# Patient Record
Sex: FEMALE | Race: WHITE | Employment: UNEMPLOYED | URBAN - METROPOLITAN AREA
[De-identification: names, ages, dates, MRNs, and addresses within clinical notes are randomized per-mention and may not be internally consistent; named-entity substitution may affect disease eponyms.]

---

## 2019-05-06 ENCOUNTER — HOSPITAL ENCOUNTER (EMERGENCY)
Age: 39
Discharge: HOME | End: 2019-05-06
Attending: EMERGENCY MEDICINE
Payer: MEDICAID

## 2019-05-06 VITALS
BODY MASS INDEX: 28.79 KG/M2 | DIASTOLIC BLOOD PRESSURE: 76 MMHG | TEMPERATURE: 99.1 F | WEIGHT: 190 LBS | HEART RATE: 76 BPM | SYSTOLIC BLOOD PRESSURE: 142 MMHG | OXYGEN SATURATION: 98 % | HEIGHT: 68 IN | RESPIRATION RATE: 17 BRPM

## 2019-05-06 DIAGNOSIS — R22.0 RIGHT FACIAL SWELLING: ICD-10-CM

## 2019-05-06 DIAGNOSIS — K08.89 PAIN, DENTAL: Primary | ICD-10-CM

## 2019-05-06 PROCEDURE — 99281 EMR DPT VST MAYX REQ PHY/QHP: CPT

## 2019-05-06 RX ORDER — CLINDAMYCIN HYDROCHLORIDE 150 MG/1
300 CAPSULE ORAL 3 TIMES DAILY
Qty: 42 CAP | Refills: 0 | Status: SHIPPED | OUTPATIENT
Start: 2019-05-06 | End: 2019-05-13

## 2019-05-06 RX ORDER — TRAMADOL HYDROCHLORIDE 50 MG/1
50 TABLET ORAL
Qty: 10 TAB | Refills: 0 | Status: SHIPPED | OUTPATIENT
Start: 2019-05-06 | End: 2019-05-09

## 2019-05-06 NOTE — DISCHARGE INSTRUCTIONS
Patient Education        Tooth and Gum Pain: Care Instructions  Your Care Instructions    The most common causes of dental pain are tooth decay and gum disease. Pain can also be caused by an infection of the tooth (abscess) or the gums. Or you may have pain from a broken or cracked tooth. Other causes of pain include infection and damage to a tooth from nervous grinding of your teeth. A wisdom tooth can be painful when it is coming in but cannot break through the gum. It can also be painful when the tooth is only partway in and extra gum tissue has formed around it. The tissue can get inflamed (pericoronitis), and sometimes it gets infected. Prompt dental care can help find the cause of your toothache and keep the tooth from dying or gum disease from getting worse. Self-care at home may reduce your pain and discomfort. Follow-up care is a key part of your treatment and safety. Be sure to make and go to all appointments, and call your dentist or doctor if you are having problems. It's also a good idea to know your test results and keep a list of the medicines you take. How can you care for yourself at home? · To reduce pain and facial swelling, put an ice or cold pack on the outside of your cheek for 10 to 20 minutes at a time. Put a thin cloth between the ice and your skin. Do not use heat. · If your doctor prescribed antibiotics, take them as directed. Do not stop taking them just because you feel better. You need to take the full course of antibiotics. · Ask your doctor if you can take an over-the-counter pain medicine, such as acetaminophen (Tylenol), ibuprofen (Advil, Motrin), or naproxen (Aleve). Be safe with medicines. Read and follow all instructions on the label. · Avoid very hot, cold, or sweet foods and drinks if they increase your pain. · Rinse your mouth with warm salt water every 2 hours to help relieve pain and swelling. Mix 1 teaspoon of salt in 8 ounces of water.   · Talk to your dentist about using special toothpaste for sensitive teeth. To reduce pain on contact with heat or cold or when brushing, brush with this toothpaste regularly or rub a small amount of the paste on the sensitive area with a clean finger 2 or 3 times a day. Floss gently between your teeth. · Do not smoke or use spit tobacco. Tobacco use can make gum problems worse, decreases your ability to fight infection in your gums, and delays healing. If you need help quitting, talk to your doctor about stop-smoking programs and medicines. These can increase your chances of quitting for good. When should you call for help? Call 911 anytime you think you may need emergency care. For example, call if:    · You have trouble breathing.    Call your dentist or doctor now or seek immediate medical care if:    · You have signs of infection, such as:  ? Increased pain, swelling, warmth, or redness. ? Red streaks leading from the area. ? Pus draining from the area. ? A fever.    Watch closely for changes in your health, and be sure to contact your doctor if:    · You do not get better as expected. Where can you learn more? Go to http://allyssa-ariana.info/. Enter [id number] in the search box to learn more about \"Tooth and Gum Pain: Care Instructions. \"  Current as of: March 27, 2018  Content Version: 11.9  © 1688-1646 [employer]. Care instructions adapted under license by [employer] (which disclaims liability or warranty for this information). If you have questions about a medical condition or this instruction, always ask your healthcare professional. [place] [address] any warranty or liability for your use of this information.

## 2019-05-06 NOTE — ED NOTES
I have reviewed discharge instructions with the patient. The patient verbalized understanding. Current Discharge Medication List  
  
START taking these medications Details  
clindamycin (CLEOCIN) 150 mg capsule Take 2 Caps by mouth three (3) times daily for 7 days. Qty: 42 Cap, Refills: 0  
  
traMADol (ULTRAM) 50 mg tablet Take 1 Tab by mouth every six (6) hours as needed for Pain for up to 3 days. Max Daily Amount: 200 mg. Qty: 10 Tab, Refills: 0 Associated Diagnoses: Pain, dental  
  
  
Patient armband removed and shredded

## 2019-05-06 NOTE — ED PROVIDER NOTES
EMERGENCY DEPARTMENT HISTORY AND PHYSICAL EXAM 
 
Date: 5/6/2019 Patient Name: Gregory Peabody History of Presenting Illness Chief Complaint Patient presents with  Dental Pain History Provided By: Patient Chief Complaint: Dental pain Duration: 5 Days Timing:  Constant Location: right upper jaw Quality: Aching Severity: 8 out of 10 Modifying Factors: minimal relief with extra strength tylenol q3hrs Associated Symptoms: nausea, R-sided facial swelling Additional History (Context): Gregory Peabody is a 41 y.o. female with No significant past medical history who presents with R-sided upper dental pain x 5days. Patient states she has a hx of dental abscess during pregnancy and was diagnosed with a dental abscess on the right upper jaw that required 3 root canals during the last month of her pregnancy 6 months ago. [facility] but does not know provider name. Patient states that the provider gave her a standing rx for amoxicillin 500mg BID in case she needed it for future dental infection which she began taking 5 days ago. Patient states advil provided no pain relief and switched to extra strength tylenol q3 hours with minimal relief. Patient denies fever, chills, vomiting, chest pain, palpitations, SOB. PCP: None Past History Past Medical History: 
History reviewed. No pertinent past medical history. Past Surgical History: 
History reviewed. No pertinent surgical history. Family History: 
History reviewed. No pertinent family history. Social History: 
Social History Tobacco Use  Smoking status: Current Every Day Smoker  Smokeless tobacco: Never Used Substance Use Topics  Alcohol use: Yes Comment: social  
 Drug use: No  
 
 
Allergies: 
No Known Allergies Review of Systems Review of Systems Constitutional: Negative for chills and fever. HENT: Positive for dental problem (right upper jaw) and facial swelling (right ). Negative for congestion, ear pain, sinus pressure, sinus pain, sore throat and trouble swallowing. Eyes: Negative for pain. Respiratory: Negative for cough, chest tightness and shortness of breath. Cardiovascular: Negative for chest pain and palpitations. Gastrointestinal: Positive for nausea. Negative for abdominal pain, diarrhea and vomiting. Musculoskeletal: Negative for arthralgias, neck pain and neck stiffness. Neurological: Positive for headaches (right sided). Negative for dizziness, weakness, light-headedness and numbness. Hematological: Negative for adenopathy. All Other Systems Negative Physical Exam  
 
Vitals:  
 05/06/19 0494 BP: 142/76 Pulse: 76 Resp: 17 Temp: 99.1 °F (37.3 °C) SpO2: 98% Weight: 86.2 kg (190 lb) Height: 5' 8\" (1.727 m) Physical Exam  
Constitutional: She appears well-developed and well-nourished. No distress. HENT:  
Head:  
 
 
Mouth/Throat: Normal dentition. No dental abscesses or dental caries. No oropharyngeal exudate, posterior oropharyngeal edema or tonsillar abscesses. Eyes: Conjunctivae are normal. Right eye exhibits no discharge. Left eye exhibits no discharge. Neck: Neck supple. Cardiovascular: Normal rate, regular rhythm and normal heart sounds. Pulmonary/Chest: Effort normal and breath sounds normal.  
Lymphadenopathy:  
  She has no cervical adenopathy. Diagnostic Study Results Labs - No results found for this or any previous visit (from the past 12 hour(s)). Radiologic Studies - No orders to display CT Results  (Last 48 hours) None CXR Results  (Last 48 hours) None Medical Decision Making I am the first provider for this patient. I reviewed the vital signs, available nursing notes, past medical history, past surgical history, family history and social history. Vital Signs-Reviewed the patient's vital signs. Pulse Oximetry Analysis - 98% on room air Records Reviewed: Nursing Notes Procedures: 
Procedures Provider Notes (Medical Decision Making): pt with right upper dental pain. Has root canal with caps to right upper teeth; tender to #4; no abscess; mild to moderated facial swelling w/o cellulitis. Will cover with clinda and refer back to yasmin reid. EDMOND Espinal 11:21 AM 
 
 
 
MED RECONCILIATION: 
No current facility-administered medications for this encounter. Current Outpatient Medications Medication Sig  
 clindamycin (CLEOCIN) 150 mg capsule Take 2 Caps by mouth three (3) times daily for 7 days.  traMADol (ULTRAM) 50 mg tablet Take 1 Tab by mouth every six (6) hours as needed for Pain for up to 3 days. Max Daily Amount: 200 mg. Disposition: 
home DISCHARGE NOTE:  
 
Pt has been reexamined. Patient has no new complaints, changes, or physical findings. Care plan outlined and precautions discussed. All medications were reviewed with the patient; will d/c home with clinda. All of pt's questions and concerns were addressed. Patient was instructed and agrees to follow up with dentist, as well as to return to the ED upon further deterioration. Patient is ready to go home. Follow-up Information Follow up With Specialties Details Why Contact Info [address] [place] [phone] Current Discharge Medication List  
  
START taking these medications Details  
clindamycin (CLEOCIN) 150 mg capsule Take 2 Caps by mouth three (3) times daily for 7 days. Qty: 42 Cap, Refills: 0  
  
traMADol (ULTRAM) 50 mg tablet Take 1 Tab by mouth every six (6) hours as needed for Pain for up to 3 days. Max Daily Amount: 200 mg. Qty: 10 Tab, Refills: 0 Associated Diagnoses: Pain, dental  
  
  
 
 
 
Diagnosis Clinical Impression: 1.  Pain, dental   
 2. Right facial swelling

## 2021-10-26 ENCOUNTER — HOSPITAL ENCOUNTER (OUTPATIENT)
Dept: OCCUPATIONAL MEDICINE | Age: 41
Discharge: HOME | End: 2021-10-26
Attending: FAMILY MEDICINE

## 2021-10-26 ENCOUNTER — OFFICE VISIT (OUTPATIENT)
Dept: ORTHOPEDIC SURGERY | Age: 41
End: 2021-10-26
Payer: MEDICAID

## 2021-10-26 VITALS
WEIGHT: 225.8 LBS | RESPIRATION RATE: 16 BRPM | OXYGEN SATURATION: 97 % | HEIGHT: 68 IN | BODY MASS INDEX: 34.22 KG/M2 | HEART RATE: 98 BPM

## 2021-10-26 DIAGNOSIS — R20.2 NUMBNESS AND TINGLING IN RIGHT HAND: ICD-10-CM

## 2021-10-26 DIAGNOSIS — M50.30 DEGENERATION, INTERVERTEBRAL DISC, CERVICAL: ICD-10-CM

## 2021-10-26 DIAGNOSIS — S29.011A CHEST WALL MUSCLE STRAIN, INITIAL ENCOUNTER: ICD-10-CM

## 2021-10-26 DIAGNOSIS — R20.0 NUMBNESS AND TINGLING IN RIGHT HAND: ICD-10-CM

## 2021-10-26 DIAGNOSIS — M75.41 SHOULDER IMPINGEMENT, RIGHT: ICD-10-CM

## 2021-10-26 DIAGNOSIS — M75.41 SHOULDER IMPINGEMENT, RIGHT: Primary | ICD-10-CM

## 2021-10-26 PROCEDURE — 20610 DRAIN/INJ JOINT/BURSA W/O US: CPT | Performed by: FAMILY MEDICINE

## 2021-10-26 PROCEDURE — 99204 OFFICE O/P NEW MOD 45 MIN: CPT | Performed by: FAMILY MEDICINE

## 2021-10-26 RX ORDER — PREDNISONE 20 MG/1
TABLET ORAL
Qty: 28 TABLET | Refills: 0 | Status: SHIPPED | OUTPATIENT
Start: 2021-10-26

## 2021-10-26 RX ORDER — METHYLPREDNISOLONE ACETATE 40 MG/ML
40 INJECTION, SUSPENSION INTRA-ARTICULAR; INTRALESIONAL; INTRAMUSCULAR; SOFT TISSUE ONCE
Status: COMPLETED | OUTPATIENT
Start: 2021-10-26 | End: 2021-10-26

## 2021-10-26 RX ADMIN — METHYLPREDNISOLONE ACETATE 40 MG: 40 INJECTION, SUSPENSION INTRA-ARTICULAR; INTRALESIONAL; INTRAMUSCULAR; SOFT TISSUE at 11:00

## 2021-10-26 NOTE — PATIENT INSTRUCTIONS
Search YouTube for my channel:    Dr. Leidy Mak cuff    Neck/Upper Back          30 seconds 3 times each side and do with arm at side, 45 degrees up, and 45 degrees down. Repeat 4-6 times per day.

## 2021-10-26 NOTE — PROCEDURES
PROCEDURE NOTE:  Time out: 1047am  * Patient was identified by name and date of birth   * Agreement on procedure being performed was verified  * Risks and Benefits explained to the patient  * Procedure site verified and marked as necessary  * Patient was positioned for comfort  * Consent was signed and verified. Risks/benefits including but not limited to bleeding, infection, and scarring discussed and Pt wishes to proceed with procedure. The area was prepped with betadine. Ethyl chloride spray was used, under sterile technique and without ultrasound guidance  1cc of 40mg/cc methylprednisolone acetate and 2cc lidocaine were injected into right shoulder subacromial bursa. Sterile gauze used to clean the area. Blood loss minimal.  Noticed improvement in pain Sx within 5 minutes (now rated 1/10). Tolerated procedure well. Discussed possible signs/Sx of infxn, and advised to seek care if concerned. Ultrasound images (if applicable) digitally attached to CPT order in patients chart.

## 2021-10-26 NOTE — PROGRESS NOTES
HISTORY OF PRESENT ILLNESS    Anamaria Serrano 1980 is a 37y.o. year old female comes in today as new patient for: right shoulder pain, hand numbness right    Patients symptoms have been present for 1 months. Pain level 5/10 shoulder burning in to arm. It has slightly improved with prednisone taper x2 which finished 3 days ago and gabapentin from [facility] but no XR. It is described as stiffness neck and burning pains shoulder and 1-3 fingers numb right. Pain certain motions right shoulder and pain laying on that side. Does have injury to neck 8-10 years ago after falling down 14 stairs. IMAGING: XR cervical/right shoulder pending. History reviewed. No pertinent surgical history. Social History     Socioeconomic History    Marital status: UNKNOWN     Spouse name: Not on file    Number of children: Not on file    Years of education: Not on file    Highest education level: Not on file   Tobacco Use    Smoking status: Current Every Day Smoker     Packs/day: 0.25    Smokeless tobacco: Never Used   Vaping Use    Vaping Use: Never used   Substance and Sexual Activity    Alcohol use: Yes     Comment: social    Drug use: No     Social Determinants of Health     Financial Resource Strain:     Difficulty of Paying Living Expenses:    Food Insecurity:     Worried About Running Out of Food in the Last Year:     [address] in the Last Year:    Transportation Needs:     Lack of Transportation (Medical):      Lack of Transportation (Non-Medical):    Physical Activity:     Days of Exercise per Week:     Minutes of Exercise per Session:    Stress:     Feeling of Stress :    Social Connections:     Frequency of Communication with Friends and Family:     Frequency of Social Gatherings with Friends and Family:     Attends Mandaen Services:     Active Member of Clubs or Organizations:     Attends Club or Organization Meetings:     Marital Status:         Past Medical History:   Diagnosis Date  Anemia      No family history on file. ROS:  See HPI      Objective:  Pulse 98   Resp 16   Ht 5' 8\" (1.727 m)   Wt 225 lb 12.8 oz (102.4 kg)   SpO2 97%   BMI 34.33 kg/m²   NEURO:  Sensation intact light touch B/L upper extremities but decreased 1-3 fingers right hand. right hand dominant. DTRs normal biceps and triceps   M/S:  Shoulder ROM Normal  bilaterally. Spurling's negative bilaterally  right Shoulder:  Empty can positive External rotation negative. Internal rotation negative. [place] negative. SLAP negative. Load and Shift +1 Anterior, 1 Posterior. Strength +5/5 bilaterally upper extremities. Crossover test negative. Negative atrophy bilaterally. Negative TTP at [facility] joint. Apprehension test negative. Shelby-Migue Test positive. Yergason's test negative. Speed's test negative. TTP serratus anterior right      Assessment/Plan:     ICD-10-CM ICD-9-CM    1. Shoulder impingement, right  M75.41 726.2 XR SHOULDER RT AP/LAT MIN 2 V      DRAIN/INJECT LARGE JOINT/BURSA      REFERRAL TO PHYSICAL THERAPY      predniSONE (DELTASONE) 20 mg tablet   2. Chest wall muscle strain, initial encounter  S29.011A 848.8 XR SHOULDER RT AP/LAT MIN 2 V      REFERRAL TO PHYSICAL THERAPY      predniSONE (DELTASONE) 20 mg tablet   3. Numbness and tingling in right hand  R20.0 782.0 XR SPINE CERV PA LAT ODONT 3 V MAX    R20.2     4. Degeneration, intervertebral disc, cervical  M50.30 722.4 XR SPINE CERV PA LAT ODONT 3 V MAX      REFERRAL TO PHYSICAL THERAPY      predniSONE (DELTASONE) 20 mg tablet       Patient (or guardian if minor) verbalizes understanding of evaluation and plan. Will start HEP, PT and Rx for prednisone tapr after inject subacromial today as above and plan follow-up 6 weeks. Stretch serratus anterior.     Total time spent on encounter including chart/imaging/lab review and evaluation/documentation/demo home program/coordination of care/form completion but not including time for any procedures/manipulation 52 minutes.

## 2021-10-26 NOTE — PROGRESS NOTES
AVS reviewed: YES  HEP: Pt effectively performed  Resources Provided: YES Both Videos & Photos  Patient questions/concerns answered: NO pt declined questions or concerns  Patient verbalized understanding of treatment plan: YES

## 2021-10-26 NOTE — LETTER
Name:  Beryl Brown   :  1980  MR#:  [id number]   [facility] / PROCEDURE   1. I (we), _____Carla Purdy__________ authorize Mark Potts DO, FAOASM                       (Patient Name)     (Provider / Proceduralist)   and/or such assistants as may be selected by him/her, to perform the following operation/procedures   _______________inject steroid in to right shoulder subacromial_____________________  _________________________________________________________________________  Note: If unable to obtain consent prior to an emergent procedure, document the emergent reason in the medical record. This procedure has been explained to my (our) satisfaction and included in the explanation was:   A) the intended benefit, nature, and extent of the procedure to be performed;   B) the significant risks involved and the probability of success;   C) alternative procedures and methods of treatment;   D) the dangers and probable consequences of such alternatives (including no procedure or treatment); E) the expected consequences of the procedure on my future health;   F) whether other qualified individuals would be performing important surgical tasks and / or whether  would be present to advise or support the procedure. I (we) understand that there are other risks of infection and other serious complications in the pre-operative/procedural and postoperative/procedural stages of my (our) care. I (we) have asked all of the questions which I (we) thought were important in deciding whether or not to undergo treatment or diagnosis. These questions have been answered to my (our) satisfaction. I (we) understand that no assurance can be given that the procedure will be a success, and no guarantee or warranty of success has been given to me (us).    2. It has been explained to me (us) that during the course of the operation/procedure, unforeseen conditions may be revealed that necessitate extension of the original procedure(s) or different procedure(s) than those set forth in Paragraph 1. I (we) authorize and request that the above-named physician, his/her assistants or his/her designees, perform procedures as necessary and desirable if deemed to be in my (our) best interest.     3. I acknowledge that other health care personnel may be observing this procedure for the purpose of medical education or other specified purposes as may be necessary as requested and/or approved by my (our) physician. 4. I (we) consent to the disposal by the hospital Pathologist of the removed tissue, parts or organs in accordance with hospital policy. Page 1 of 2          Name:  Cisco Douglas         :  1980         MR#:  [id number]      8. I do_x_ do not______ consent to the use of a local infiltration pain blocking agent that will be used by my provider/surgical provider to help alleviate pain during my procedure. 6. I do_____ do not_x_ consent to an emergent blood transfusion in the case of a life-threatening situation that requires blood components to be administered. This consent is valid for 24 hours from the beginning of the procedure. 7. This patient does _____ or does not _x__currently have a DNR status/order. If DNR order is in place, obtain \"Addendum to the Surgical Consent for ALL Patients with a DNR Order\" to address antoinette-operative status for limited intervention or DNR suspension. 8. I have read and fully understand the above Consent for Operation/Procedure and that all blanks were completed before I signed the consent.    X____________________________________ _____Carla R Rawlings_________   Date: 10/26/2021/_______am/pm   Signature of Patient or legal representative.    _____________________________ ____Cassidy Ruiz, ATC____Traleni Aceves, LPN_____   Date: /_______ am/pm   Witness to Signature Printed Name Date / Time    (If patient is unable to sign or is a minor, complete the following)     Patient is a minor, ____years of age, or unable to sign because:   _________________________________________________________________________  Oliver Riley If a phone consent is obtained, consent will be documented by using two health care professionals, each affirming that the consenting party has no questions and gives consent for the procedure discussed with the physician/provider.     _________________________________ _______________________________   Date: ______/_____am/pm   2nd witness to phone consent Printed name Date / Time   Informed consent:   I have provided the explanation described above in section 1 to the patient and/or legal representative. I have provided the patient and/or legal representative with an opportunity to ask any questions about the proposed operation/procedure. _                       _ __REZA Hartman____ 10/26/2021/_______ am/pm   Provider / Proceduralist Printed Name Date / Time   This Provider / Proceduralist performing the surgery is ONLY for Office-based procedures in [place]   [x] Board certified or Board eligible by one of the [employer], the [employer] [facility] the [employer], the [employer] of Podiatric Medicine, the [employer] of Foot and Ankle Surgery, or other board as approved by the hospital for medical staff appointment.    Revised 8/2/2021 Page 2 of 2

## 2021-11-09 ENCOUNTER — HOSPITAL ENCOUNTER (OUTPATIENT)
Dept: PHYSICAL THERAPY | Age: 41
Discharge: HOME | End: 2021-11-09
Attending: FAMILY MEDICINE

## 2021-11-09 NOTE — PROGRESS NOTES
PT DAILY TREATMENT NOTE - [facility]     Patient Name: Tracy Rhodes  Date:2021  : 1980  [x]  Patient  Verified  Payor: BLUE CROSS MEDICAID / Plan: Misaeltwyla Cochran / Product Type: Managed Care Medicaid /    In time:***  Out time:***  Total Treatment Time (min): ***  Total Timed Codes (min): ***  1:1 Treatment Time ( only): ***   Visit #: *** of ***    Treatment Area: Right shoulder pain [M25.511]  Neck pain [M54.2]    SUBJECTIVE  Pain Level (0-10 scale): ***  Any medication changes, allergies to medications, adverse drug reactions, diagnosis change, or new procedure performed?: [x] No    [] Yes (see summary sheet for update)  Subjective functional status/changes:   [] No changes reported    Does have injury to neck 8-10 years ago after falling down 14 stairs.     OBJECTIVE    Modality rationale: {BSI INMOTION MODALITIES:01308} to improve the patients ability to ***   Min Type Additional Details    [] Estim:  []Unatt       []IFC  []Premod                        []Other:  []w/ice   []w/heat  Position:  Location:    [] Estim: []Att    []TENS instruct  []NMES                    []Other:  []w/US   []w/ice   []w/heat  Position:  Location:    []  Traction: [] Cervical       []Lumbar                       [] Prone          []Supine                       []Intermittent   []Continuous Lbs:  [] before manual  [] after manual    []  Ultrasound: []Continuous   [] Pulsed                           []1MHz   []3MHz W/cm2:  Location:    []  Iontophoresis with dexamethasone         Location: [] Take home patch   [] In clinic    []  Ice     []  heat  []  Ice massage  []  Laser   []  Anodyne Position:  Location:    []  Laser with stim  []  Other:  Position:  Location:    []  Vasopneumatic Device Pressure:       [] lo [] med [] hi   Temperature: [] lo [] med [] hi   [] Skin assessment post-treatment:  []intact []redness- no adverse reaction    []redness  adverse reaction:     *** min []Eval []Re-Eval       *** min Therapeutic Exercise:  [] See flow sheet :   Rationale: {BSHSI IMMOTION THER EX:96632} to improve the patients ability to ***    *** min Therapeutic Activity:  []  See flow sheet :   Rationale: {BSHSI IMMOTION THER EX:02531}  to improve the patients ability to ***     *** min Neuromuscular Re-education:  []  See flow sheet :   Rationale: {BSHSI IMMOTION THER EX:85360}  to improve the patients ability to ***    *** min Manual Therapy:  ***   Rationale: {BSHSI IMMOTION MANUAL THERAPY:75255} to ***    *** min Gait Training:  ___ feet with ___ device on level surfaces with ___ level of assist   Rationale: With   [] TE   [] TA   [] neuro   [] other: Patient Education: [x] Review HEP    [] Progressed/Changed HEP based on:   [] positioning   [] body mechanics   [] transfers   [] heat/ice application    [] other:      Other Objective/Functional Measures:     Degenerative disc disease of the mid to lower cervical spine. Mild posterior offset of C5 on C6 with degenerative disc changes    Pain Level (0-10 scale) post treatment: ***    ASSESSMENT/Changes in Function: ***    Patient will continue to benefit from skilled PT services to modify and progress therapeutic interventions, address functional mobility deficits, address ROM deficits, address strength deficits, analyze and address soft tissue restrictions, analyze and cue movement patterns, analyze and modify body mechanics/ergonomics, assess and modify postural abnormalities and address imbalance/dizziness to attain remaining goals.      []  See Plan of Care  []  See progress note/recertification  []  See Discharge Summary         Progress towards goals / Updated goals:    See eval  PLAN  [x]  Upgrade activities as tolerated     [x]  Continue plan of care  []  Update interventions per flow sheet       []  Discharge due to:_  []  Other:_      Jacob Dale, PT 11/9/2021  1:39 PM    Future Appointments   Date Time Provider Department Center   11/9/2021  5:15 PM Nataly Hutton PT [id number]   12/7/2021 10:00 AM DO VELMA Cervantes AMB

## 2021-11-23 ENCOUNTER — HOSPITAL ENCOUNTER (OUTPATIENT)
Dept: PHYSICAL THERAPY | Age: 41
Discharge: HOME | End: 2021-11-23
Attending: FAMILY MEDICINE

## 2021-11-23 NOTE — PROGRESS NOTES
PT DAILY TREATMENT NOTE - [facility]     Patient Name: Suad Seed  Date:2021  : 1980  [x]  Patient  Verified  Payor: BLUE CROSS MEDICAID / Plan: Miesha Martinez / Product Type: Managed Care Medicaid /    In time:***  Out time:***  Total Treatment Time (min): ***  Total Timed Codes (min): ***  1:1 Treatment Time ( only): ***   Visit #: *** of ***    Treatment Area: Right shoulder pain [M25.511]  Neck pain [M54.2]    SUBJECTIVE  Pain Level (0-10 scale): ***  Any medication changes, allergies to medications, adverse drug reactions, diagnosis change, or new procedure performed?: [x] No    [] Yes (see summary sheet for update)  Subjective functional status/changes:   [] No changes reported  ***    OBJECTIVE    Modality rationale: {BSHSI INMOTION MODALITIES:74901} to improve the patients ability to ***   Min Type Additional Details    [] Estim:  []Unatt       []IFC  []Premod                        []Other:  []w/ice   []w/heat  Position:  Location:    [] Estim: []Att    []TENS instruct  []NMES                    []Other:  []w/US   []w/ice   []w/heat  Position:  Location:    []  Traction: [] Cervical       []Lumbar                       [] Prone          []Supine                       []Intermittent   []Continuous Lbs:  [] before manual  [] after manual    []  Ultrasound: []Continuous   [] Pulsed                           []1MHz   []3MHz W/cm2:  Location:    []  Iontophoresis with dexamethasone         Location: [] Take home patch   [] In clinic    []  Ice     []  heat  []  Ice massage  []  Laser   []  Anodyne Position:  Location:    []  Laser with stim  []  Other:  Position:  Location:    []  Vasopneumatic Device Pressure:       [] lo [] med [] hi   Temperature: [] lo [] med [] hi   [] Skin assessment post-treatment:  []intact []redness- no adverse reaction    []redness  adverse reaction:     *** min []Eval                  []Re-Eval       *** min Therapeutic Exercise:  [] See flow sheet :   Rationale: {BSHSI IMMOTION THER EX:77644} to improve the patients ability to ***    *** min Therapeutic Activity:  []  See flow sheet :   Rationale: {BSHSI IMMOTION THER EX:46550}  to improve the patients ability to ***     *** min Neuromuscular Re-education:  []  See flow sheet :   Rationale: {BSHSI IMMOTION THER EX:89247}  to improve the patients ability to ***    *** min Manual Therapy:  ***   Rationale: {BSHSI IMMOTION MANUAL THERAPY:91502} to ***    *** min Gait Training:  ___ feet with ___ device on level surfaces with ___ level of assist   Rationale: With   [] TE   [] TA   [] neuro   [] other: Patient Education: [x] Review HEP    [] Progressed/Changed HEP based on:   [] positioning   [] body mechanics   [] transfers   [] heat/ice application    [] other:      Other Objective/Functional Measures: ***     Pain Level (0-10 scale) post treatment: ***    ASSESSMENT/Changes in Function: ***    Patient will continue to benefit from skilled PT services to {[facility] INMOTION ASSESSMENT STATEMENTS:20159} to attain remaining goals.      []  See Plan of Care  []  See progress note/recertification  []  See Discharge Summary         Progress towards goals / Updated goals:  ***    PLAN  []  Upgrade activities as tolerated     []  Continue plan of care  []  Update interventions per flow sheet       []  Discharge due to:_  []  Other:_      Vikki Majano PT 11/23/2021  10:30 AM    Future Appointments   Date Time Provider Melissa Collier   11/23/2021  5:15 PM Chuck Boucher, PT [facility]   12/7/2021 10:00 AM DO VELMA Olson BS AMB

## 2021-12-07 ENCOUNTER — OFFICE VISIT (OUTPATIENT)
Dept: ORTHOPEDIC SURGERY | Age: 41
End: 2021-12-07
Payer: MEDICAID

## 2021-12-07 VITALS
HEART RATE: 107 BPM | HEIGHT: 68 IN | OXYGEN SATURATION: 99 % | WEIGHT: 226.8 LBS | BODY MASS INDEX: 34.37 KG/M2 | RESPIRATION RATE: 16 BRPM

## 2021-12-07 DIAGNOSIS — M75.41 SHOULDER IMPINGEMENT, RIGHT: ICD-10-CM

## 2021-12-07 DIAGNOSIS — M50.30 DEGENERATION, INTERVERTEBRAL DISC, CERVICAL: Primary | ICD-10-CM

## 2021-12-07 DIAGNOSIS — S29.011D CHEST WALL MUSCLE STRAIN, SUBSEQUENT ENCOUNTER: ICD-10-CM

## 2021-12-07 DIAGNOSIS — R20.2 NUMBNESS AND TINGLING IN RIGHT HAND: ICD-10-CM

## 2021-12-07 DIAGNOSIS — R20.0 NUMBNESS AND TINGLING IN RIGHT HAND: ICD-10-CM

## 2021-12-07 PROCEDURE — 99213 OFFICE O/P EST LOW 20 MIN: CPT | Performed by: FAMILY MEDICINE

## 2021-12-07 RX ORDER — GABAPENTIN 300 MG/1
CAPSULE ORAL
Qty: 90 CAPSULE | Refills: 2 | Status: SHIPPED | OUTPATIENT
Start: 2021-12-07

## 2021-12-07 NOTE — PROGRESS NOTES
HISTORY OF PRESENT ILLNESS    Beryl Brown 1980 is a 37y.o. year old female comes in today to be evaluated and treated for: right shoulder pain    Since last appt has noticed improvement in shoulder pain but pain right elbow worse when bumped. Also pain anterior shoulder/chest.  Still numbness/tingle in right hand. Only went to PT once as sone exposed to COVID-19 next appt 12/10/2021. Pain level 8/10. Using prednisone taper with some benefit. Gabapentin had helped but ran out. IMAGING: XR right shoulder 10/26/2021  IMPRESSION:  Negative shoulder. XR cervical 10/26/2021  IMPRESSION:  Degenerative disc disease of the mid to lower cervical spine. Mild posterior offset of C5 on C6 with degenerative disc changes    History reviewed. No pertinent surgical history. Social History     Socioeconomic History    Marital status: UNKNOWN   Tobacco Use    Smoking status: Current Every Day Smoker     Packs/day: 0.25    Smokeless tobacco: Never Used   Vaping Use    Vaping Use: Never used   Substance and Sexual Activity    Alcohol use: Yes     Comment: social    Drug use: No     Current Outpatient Medications   Medication Sig Dispense Refill    predniSONE (DELTASONE) 20 mg tablet Take 2 tabs in AM with food for 7 days then 1 tab in AM with food until gone (Patient not taking: Reported on 12/7/2021) 28 Tablet 0     Past Medical History:   Diagnosis Date    Anemia      No family history on file. ROS:  See HPI    Objective:  Pulse (!) 107   Resp 16   Ht 5' 8\" (1.727 m)   Wt 226 lb 12.8 oz (102.9 kg)   SpO2 99%   BMI 34.48 kg/m²   NEURO:  Sensation intact light touch B/L upper extremities but decreased 1-3 fingers right hand. right hand dominant. DTRs normal biceps and triceps   M/S:  Shoulder ROM Normal  bilaterally. Spurling's negative bilaterally  right Shoulder:  Empty can negative External rotation negative. Internal rotation negative. Dickey negative. SLAP negative.   Load and Shift +1 Anterior, 1 Posterior. Strength +5/5 bilaterally upper extremities. Crossover test negative. Negative atrophy bilaterally. Negative TTP at [facility] [place] joint. Apprehension test negative. Shelby-Migue Test negative. Yergason's test negative. Speed's test negative. No longer TTP serratus anterior right. Assessment/Plan:     ICD-10-CM ICD-9-CM    1. Degeneration, intervertebral disc, cervical  M50.30 722.4 gabapentin (NEURONTIN) 300 mg capsule   2. Numbness and tingling in right hand  R20.0 782.0     R20.2     3. Shoulder impingement, right  M75.41 726.2    4. Chest wall muscle strain, subsequent encounter  S29.011D V58.89      848.8        Patient (or guardian if minor) verbalizes understanding of evaluation and plan. Will do second appt PT and restart gabapentin to titrate as tolerated. Follow-up 5 weeks.

## 2021-12-07 NOTE — PATIENT INSTRUCTIONS
Search YouTube for my channel:    Dr. Analy Lopez cuff  Low back/Piriformis  Runner's Knee  Hip Stretches  Plantar Fasciitis  IT Band  Concussion  Pes Anserine/Plica  Davis Splints  Carpal Tunnel  Neck/Upper back

## 2021-12-10 ENCOUNTER — APPOINTMENT (OUTPATIENT)
Dept: PHYSICAL THERAPY | Age: 41
End: 2021-12-10
Attending: FAMILY MEDICINE
Payer: MEDICAID

## 2021-12-22 ENCOUNTER — HOSPITAL ENCOUNTER (OUTPATIENT)
Dept: PHYSICAL THERAPY | Age: 41
Discharge: HOME | End: 2021-12-22
Attending: FAMILY MEDICINE
Payer: MEDICAID

## 2021-12-22 PROCEDURE — 97161 PT EVAL LOW COMPLEX 20 MIN: CPT

## 2021-12-22 NOTE — PROGRESS NOTES
[facility] [employer] [facility]  [address] [place] [address]  [phone] fax    Plan of Care/ Statement of Necessity for Physical Therapy Services    Patient name: Sheila Payton Start of Care: 2021   Referral source: Yuli DixonDO : 1980    Medical Diagnosis: Right shoulder pain [M25.511]  Neck pain [M54.2]  Payor: BLUE CROSS MEDICAID / Plan: [employer] / Product Type: Managed Care Medicaid /  Onset Date:about e months ago    Treatment Diagnosis: right Neck and shoulder pain   Prior Hospitalization: see medical history Provider#: [id number]   Medications: Verified on Patient summary List    Comorbidities: n/a   Prior Level of Function: Right handed, 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 Assessment/ graham information: Ms. Casie Tillman is a 36 y/o, F pt with CC of Right neck and shoulder pain. Pt reports insidious onset. Cortisol shot (in the upper back) worsened numbness symptoms  Numbness mostly locates along first 3 fingers but also radiates to whole hand after working for a while (liting repetitively). c/s X-ray shows Degenerative disc disease of the mid to lower cervical spine; Mild posterior offset of C5 on C6 with degenerative disc changes, narrowed disc space at C4-5 & C5-6, small ventral spurs. Negative findings with right shoulder X-ray. Pt present with no numbness today as she just took her medication. Spurling on right re-produced pain with neck (lev scap/mid trap region), distraction relieves pain/tension. Pt demonstrates decreased strength of Right UE, mid trap, lower trap compared to Left, TTP along c/s paraspinal, UTs, lev scap of Right side ROM.  WNL with light touch, (-) with Phalen's test. Pt would benefit from skilled PT to address these deficits above to improve the ability to perform ADLs and recreational activities comfortably and safely. Evaluation Complexity History LOW Complexity : Zero comorbidities / personal factors that will impact the outcome / POC; Examination LOW Complexity : 1-2 Standardized tests and measures addressing body structure, function, activity limitation and / or participation in recreation  ;Presentation MEDIUM Complexity : Evolving with changing characteristics  ; Clinical Decision Making MEDIUM Complexity : FOTO score of 26-74  Overall Complexity Rating: LOW   Problem List: pain affecting function, decrease strength, edema affecting function, decrease ADL/ functional abilitiies, decrease activity tolerance and decrease flexibility/ joint mobility   Treatment Plan may include any combination of the following: Therapeutic exercise, Therapeutic activities, Neuromuscular re-education, Physical agent/modality, Manual therapy, Patient education, Self Care training, Functional mobility training and Home safety training  Patient / Family readiness to learn indicated by: asking questions, trying to perform skills and interest  Persons(s) to be included in education: patient (P)  Barriers to Learning/Limitations: None  Patient Goal (s): avoid numbness in fingers  Patient Self Reported Health Status: fair  Rehabilitation Potential: good    Short term goals: To be accomplished within 1 week  1. Pt will be independent with HEP to maintain progression. Eval status: good understanding     Long term goals: To be accomplished within 6 weeks  1. Pt will improve FOTO score by TBd points to TBD/100 to show improvement with functional mobility performance. Eval status: TBD     2. Pt will report at least 50% improvement of pain and numbness symptoms to improve ease with ADLs/job duties. Eval status: 3-10/10, constant numbness with hands & fingers     3. Pt will improve right shoulder abd, elbow ext, mid and lower trap strength to at least 4/5 to be able to perform ADLs comfortably. Eval status: 3/5 grossly    4. Pt will report No Difficulty with usual work, house chores to improve her QOL. Eval status: TBD with FOTO    Frequency / Duration: Patient to be seen 2-3 times per week for 4-6 weeks. Patient/ CarPatient/ Caregiver education and instruction: Diagnosis, prognosis, self care, activity modification, brace/ splint application and exercises   [x]  Plan of care has been reviewed with PTA Thienphuc Burnard Schwab 12/22/2021 8:10 AM    ________________________________________________________________________    I certify that the above Therapy Services are being furnished while the patient is under my care. I agree with the treatment plan and certify that this therapy is necessary.     [de-identified] Signature:____________Date:_________TIME:________     Robson Veronica DO  ** Signature, Date and Time must be completed for valid certification **    Please sign and return to [facility] [employer] [facility]  [address] [place] [address]  [phone] fax

## 2021-12-22 NOTE — PROGRESS NOTES
PT DAILY TREATMENT NOTE/SHOULDER EVAL     Patient Name: Fran Lyman  Date:2021  : 1980  [x]  Patient  Verified  Payor: BLUE CROSS MEDICAID / Plan: "[employer] Screen / Product Type: Managed Care Medicaid /    In time: 11:16  Out time: 11:49  Total Treatment Time (min): 33  Visit #: 1 of     Medicare/BCBS Only   Total Timed Codes (min):  15 1:1 Treatment Time:  33       Treatment Area: Right shoulder pain [M25.511]  Neck pain [M54.2]    SUBJECTIVE  Pain Level (0-10 scale): 310  []constant []intermittent []improving []worsening []no change since onset    Any medication changes, allergies to medications, adverse drug reactions, diagnosis change, or new procedure performed?: [x] No    [] Yes (see summary sheet for update)  Subjective functional status/changes:     PLOF: Right handed, 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 Sam Gomes is a 38 y/o, F pt with CC of Right neck and shoulder pain. Pt reports insidious onset. Cortisol shot (in the upper back) worsened numbness symptoms  Numbness mostly locates along first 3 fingers but also radiates to whole hand after working for a while (liting repetitively). c/s X-ray shows Degenerative disc disease of the mid to lower cervical spine; Mild posterior offset of C5 on C6 with degenerative disc changes, narrowed disc space at C4-5 & C5-6, small ventral spurs. Negative findings with right shoulder X-ray.     Previous Treatment/Compliance:   PMHx/Surgical Hx:   Work Hx:   Living Situation:   Pt Goals: avoid numbness in fingers  Barriers: []pain []financial []time []transportation []other  Motivation:   Substance use: []Alcohol []Tobacco []other:   FABQ Score: []low []elevate  Cognition: A & O x     Other:    OBJECTIVE/EXAMINATION  Domestic Life:   Activity/Recreational Limitations:   Mobility:   Self Care:       18 min [x]Eval                  []Re-Eval       10 min Therapeutic Activity:  []  See flow sheet :Pt edu within scope of practice on prognosis, POC, lifting mechanics, massage therapy, modalities use, HEP review   Rationale: increase ROM and increase strength  to improve the patients ability to perform ADLs with more ease      5 done by ATC min Manual Therapy:  KT for upper/mid trap inhibition   The manual therapy interventions were performed at a separate and distinct time from the therapeutic activities interventions.   Rationale: decrease pain, increase tissue extensibility and decrease trigger points to improve pt's tolerance for ADLs/job duties    With   [] TE   [] TA   [] neuro   [] other: Patient Education: [x] Review HEP    [] Progressed/Changed HEP based on:   [] positioning   [] body mechanics   [] transfers   [] heat/ice application    [] other:      Other Objective/Functional Measures:     Physical Therapy Evaluation - Shoulder    Posture: [] Poor    [x] Fair    [] Good    Describe: min forwarded head    ROM:  [] Unable to assess at this time c/s and shoulder AROM are WNL                                           AROM                                                              PROM   Left Right  Left Right   Flexion   Flexion     Extension   Extension     Scaption/ABD   Scaptin/ABD     ER @ 0 Degrees   ER @ 0 Degrees     ER @ 90 Degrees   ER @ 90 Degrees     IR @ 90 Degrees   IR @ 90 Degrees       End Feel / Painful Arc:    Strength:   [] Unable to assess at this time                                                                            L (1-5) R (1-5) Pain   Flexors 4+ 4+ [] Yes   [] No   Abductors 4 3+ [] Yes   [] No   External Rotators 4 4 [] Yes   [] No   Internal Rotators 5 5 [] Yes   [] No   Supraspinatus   [] Yes   [] No   Serratus Anterior 4+ 4+ [] Yes   [] No   Lower Trapezius 4 3 [] Yes   [] No   Elbow Flexion 5 5 [] Yes   [] No   Elbow Extension 5 3+ [] Yes   [] No       Scapulohumoral Control / Rhythm:  Able to eccentrically lower with good control? Left: [x] Yes   [] No     Right: [x] Yes   [] No    Accessory Motions:    Palpation  [] Min  [x] Mod  [] Severe    Location: TTP along Right c/s paraspinal, lev scap, UTs, scalene   [] Min  [] Mod  [] Severe    Location:  [] Min  [] Mod  [] Severe    Location:    Optional Tests:    Sensation Left Right Reflexes Left Right   Biceps (C5)   Biceps (C5)     Hickey Radial(C6-7)   Brachioradialis (C6)     Hickey Ulnar(C8-T1)   Triceps (C7)       Adson's Test  [] Pos   [] Neg Yergason's Test [] Pos   [] Neg  Korina's Test  [] Pos   [] Neg Long's Sign [] Pos   [] Neg  Neer's Test  [] Pos   [] Neg Clunk Test  [] Pos   [] Neg  Hawkin's Test  [] Pos   [] Neg AC Joint  [] Pos   [] Neg  Speed's Test  [] Pos   [] Neg SC Joint  [] Pos   [] Neg  Empty Can  [] Pos   [] Neg Pectoral Tightness [] Pos   [] Neg  Anterior Apprehension [] Pos   [] Neg   Posterior Apprehension [] Pos   [] Neg      Other Tests / Comments:    Pulling with c/s rot   (+) Spurling on Right, improved pain with distraction   (-) with Phalen     Pain Level (0-10 scale) post treatment: 0/10    ASSESSMENT/Changes in Function: see POC    Patient will continue to benefit from skilled PT services to modify and progress therapeutic interventions, address functional mobility deficits, address ROM deficits, address strength deficits, analyze and address soft tissue restrictions, analyze and cue movement patterns, analyze and modify body mechanics/ergonomics, assess and modify postural abnormalities and instruct in home and community integration to attain remaining goals.      [x]  See Plan of Care  []  See progress note/recertification  []  See Discharge Summary         Progress towards goals / Updated goals:  See POC    PLAN  [x]  Upgrade activities as tolerated     [x]  Continue plan of care  []  Update interventions per flow sheet       []  Discharge due to:_  []  Other:_      To Dylan 12/22/2021  8:09 AM

## 2022-02-09 ENCOUNTER — TELEPHONE (OUTPATIENT)
Dept: PHYSICAL THERAPY | Age: 42
End: 2022-02-09

## 2022-02-18 NOTE — PROGRESS NOTES
[facility]  [facility]  [address] [facility]  [phone] fax    Physical Therapy Discharge Summary       Patient name: Edmund Sosa Start of Care: 2021   Referral source: Viral Gaitan DO : 1980                Medical Diagnosis: Right shoulder pain [M25.511]  Neck pain [M54.2]  Payor: BLUE CROSS MEDICAID / Plan: Nieves Locus / Product Type: Managed Care Medicaid /  Onset Date:about e months ago                Treatment Diagnosis: right Neck and shoulder pain   Prior Hospitalization: see medical history Provider#: [id number]   Medications: Verified on Patient summary List    Comorbidities: n/a   Prior Level of Function: Right handed, 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 Pt will be independent with HEP to maintain progression. Eval status: good understanding  Status at discharge: not met, unplanned discharge     Long term goals: To be accomplished within 6 weeks  1. Pt will improve FOTO score by TBd points to TBD/100 to show improvement with functional mobility performance. Eval status: TBD  Status at discharge: not met, unplanned discharge     2. Pt will report at least 50% improvement of pain and numbness symptoms to improve ease with ADLs/job duties.   Eval status: 3-10/10, constant numbness with hands & fingers  Status at discharge: not met, unplanned discharge     3. Pt will improve right shoulder abd, elbow ext, mid and lower trap strength to at least 4/5 to be able to perform ADLs comfortably.   Eval status: 3/5 grossly  Status at discharge: not met, unplanned discharge     4. Pt will report No Difficulty with usual work, house chores to improve her QOL.   Eval status: TBD with FOTO  Status at discharge: not met, unplanned discharge ASSESSMENT/RECOMMENDATIONS: Pt didn't return call to schedule treatment.      [x]Discontinue therapy: []Patient has reached or is progressing toward set goals      [x]Patient is non-compliant or has abdicated      []Due to lack of appreciable progress towards set goals    Nilton Fermin 2/18/2022 10:24 AM

## 2023-12-15 RX ORDER — IBUPROFEN 800 MG/1
800 TABLET ORAL EVERY 6 HOURS PRN
COMMUNITY
Start: 2018-09-22 | End: 2023-12-15

## 2023-12-15 RX ORDER — RANITIDINE 150 MG/1
150 TABLET ORAL DAILY
COMMUNITY
End: 2023-12-15

## 2023-12-15 RX ORDER — METOPROLOL TARTRATE 50 MG/1
25 TABLET, FILM COATED ORAL 2 TIMES DAILY PRN
COMMUNITY
Start: 2023-10-26

## 2023-12-15 RX ORDER — OXYCODONE HYDROCHLORIDE AND ACETAMINOPHEN 5; 325 MG/1; MG/1
1-2 TABLET ORAL EVERY 4 HOURS PRN
COMMUNITY
Start: 2018-09-22 | End: 2023-12-15

## 2023-12-15 RX ORDER — CITALOPRAM 20 MG/1
TABLET ORAL
COMMUNITY

## 2023-12-15 NOTE — PERIOP NOTE
Instructions for your procedure at [facility]      Today's Date: 12/15/2023      Patient's Name: Yuko Ugarte      Procedure Date: 1/8        Please enter the main entrance of the hospital and check-in at the  located in the lobby.      Do NOT eat or drink anything, including candy, gum, or ice chips after midnight prior to your procedure, unless it is part of your prep.  Brush your teeth before coming to the hospital.You may swish with water, but do not swallow.  No smoking/Vaping/E-Cigarettes 24 hours prior to the day of procedure.  No alcohol 24 hours prior to the day of procedure.  No recreational drugs for one week prior to the day of procedure.  Bring Photo ID, Insurance information, and Co-pay if required on day of procedure.  Bring in pertinent legal documents, such as, Medical Power of , DNR, Advance Directive, etc.  Leave all other valuables, including money/purse, at home.  Remove jewelry, including ALL body piercings, nail polish, acrylic nails, and makeup (including mascara); no lotions, powders, deodorant, and/or perfume/cologne/after shave on the skin.  Glasses and dentures may be worn to the hospital.  They must be removed prior to procedure. Please bring case/container for glasses or dentures.  11. Contacts should not be worn on day of procedure.   12. Call the office ([phone]) if you have symptoms of a cold or illness within 24-48 hours prior to your procedure.   13. AN ADULT (relative or friend 18 years or older) MUST DRIVE YOU HOME AFTER YOUR PROCEDURE.   14. Please make arrangements for a responsible adult (18 years or older) to be with you for 24 hours after your procedure.   15. ONE VISITOR will be allowed in the waiting area during your procedure.       Special Instructions:      Bring list of CURRENT medications.  Follow instructions from the office regarding Bowel Prep, Vitamins, Iron, Blood Thinners, Insulin, Seizure, and Blood

## 2024-01-05 ENCOUNTER — ANESTHESIA EVENT (OUTPATIENT)
Facility: HOSPITAL | Age: 44
End: 2024-01-05
Payer: MEDICAID

## 2024-01-08 ENCOUNTER — ANESTHESIA (OUTPATIENT)
Facility: HOSPITAL | Age: 44
End: 2024-01-08
Payer: MEDICAID

## 2024-01-08 ENCOUNTER — HOSPITAL ENCOUNTER (OUTPATIENT)
Facility: HOSPITAL | Age: 44
Setting detail: OUTPATIENT SURGERY
Discharge: HOME | End: 2024-01-08
Attending: INTERNAL MEDICINE | Admitting: INTERNAL MEDICINE
Payer: MEDICAID

## 2024-01-08 VITALS
RESPIRATION RATE: 16 BRPM | SYSTOLIC BLOOD PRESSURE: 125 MMHG | WEIGHT: 207 LBS | TEMPERATURE: 98.4 F | HEIGHT: 68 IN | BODY MASS INDEX: 31.37 KG/M2 | DIASTOLIC BLOOD PRESSURE: 64 MMHG | OXYGEN SATURATION: 99 % | HEART RATE: 52 BPM

## 2024-01-08 LAB — HCG UR QL: NEGATIVE

## 2024-01-08 PROCEDURE — 7100000010 HC PHASE II RECOVERY - FIRST 15 MIN: Performed by: INTERNAL MEDICINE

## 2024-01-08 PROCEDURE — 81025 URINE PREGNANCY TEST: CPT

## 2024-01-08 PROCEDURE — 7100000000 HC PACU RECOVERY - FIRST 15 MIN: Performed by: INTERNAL MEDICINE

## 2024-01-08 PROCEDURE — 2580000003 HC RX 258: Performed by: NURSE ANESTHETIST, CERTIFIED REGISTERED

## 2024-01-08 PROCEDURE — 2709999900 HC NON-CHARGEABLE SUPPLY: Performed by: INTERNAL MEDICINE

## 2024-01-08 PROCEDURE — 3600007502: Performed by: INTERNAL MEDICINE

## 2024-01-08 PROCEDURE — 88342 IMHCHEM/IMCYTCHM 1ST ANTB: CPT

## 2024-01-08 PROCEDURE — 3700000000 HC ANESTHESIA ATTENDED CARE: Performed by: INTERNAL MEDICINE

## 2024-01-08 PROCEDURE — 88305 TISSUE EXAM BY PATHOLOGIST: CPT

## 2024-01-08 PROCEDURE — 6360000002 HC RX W HCPCS: Performed by: ANESTHESIOLOGY

## 2024-01-08 RX ORDER — PROPOFOL 10 MG/ML
INJECTION, EMULSION INTRAVENOUS PRN
Status: DISCONTINUED | OUTPATIENT
Start: 2024-01-08 | End: 2024-01-08 | Stop reason: SDUPTHER

## 2024-01-08 RX ORDER — OMEPRAZOLE 20 MG/1
40 CAPSULE, DELAYED RELEASE ORAL DAILY
COMMUNITY

## 2024-01-08 RX ORDER — SODIUM CHLORIDE 9 MG/ML
INJECTION, SOLUTION INTRAVENOUS PRN
Status: DISCONTINUED | OUTPATIENT
Start: 2024-01-08 | End: 2024-01-08 | Stop reason: HOSPADM

## 2024-01-08 RX ORDER — SODIUM CHLORIDE, SODIUM LACTATE, POTASSIUM CHLORIDE, CALCIUM CHLORIDE 600; 310; 30; 20 MG/100ML; MG/100ML; MG/100ML; MG/100ML
INJECTION, SOLUTION INTRAVENOUS CONTINUOUS
Status: DISCONTINUED | OUTPATIENT
Start: 2024-01-08 | End: 2024-01-08 | Stop reason: HOSPADM

## 2024-01-08 RX ORDER — SODIUM CHLORIDE 0.9 % (FLUSH) 0.9 %
5-40 SYRINGE (ML) INJECTION PRN
Status: DISCONTINUED | OUTPATIENT
Start: 2024-01-08 | End: 2024-01-08 | Stop reason: HOSPADM

## 2024-01-08 RX ORDER — SODIUM CHLORIDE 0.9 % (FLUSH) 0.9 %
5-40 SYRINGE (ML) INJECTION EVERY 12 HOURS SCHEDULED
Status: DISCONTINUED | OUTPATIENT
Start: 2024-01-08 | End: 2024-01-08 | Stop reason: HOSPADM

## 2024-01-08 RX ADMIN — PROPOFOL 30 MG: 10 INJECTION, EMULSION INTRAVENOUS at 09:34

## 2024-01-08 RX ADMIN — PROPOFOL 30 MG: 10 INJECTION, EMULSION INTRAVENOUS at 09:32

## 2024-01-08 RX ADMIN — PROPOFOL 100 MG: 10 INJECTION, EMULSION INTRAVENOUS at 09:29

## 2024-01-08 RX ADMIN — SODIUM CHLORIDE, POTASSIUM CHLORIDE, SODIUM LACTATE AND CALCIUM CHLORIDE: 600; 310; 30; 20 INJECTION, SOLUTION INTRAVENOUS at 09:13

## 2024-01-08 ASSESSMENT — PAIN - FUNCTIONAL ASSESSMENT: PAIN_FUNCTIONAL_ASSESSMENT: 0-10

## 2024-01-08 ASSESSMENT — PAIN SCALES - GENERAL
PAINLEVEL_OUTOF10: 0
PAINLEVEL_OUTOF10: 0

## 2024-01-08 NOTE — ANESTHESIA PRE PROCEDURE
Department of Anesthesiology  Preprocedure Note       Name:  Yuko Ugarte   Age:  43 y.o.  :  1980                                          MRN:  [id number]         Date:  2024      Surgeon: Surgeon(s):  Jackelin Aguila MD    Procedure: Procedure(s):  EGD ESOPHAGOGASTRODUODENOSCOPY    Medications prior to admission:   Prior to Admission medications    Medication Sig Start Date End Date Taking? Authorizing Provider   omeprazole (PRILOSEC) 20 MG delayed release capsule Take 2 capsules by mouth daily   Yes Provider, MD Carmen   metoprolol tartrate (LOPRESSOR) 50 MG tablet Take 0.5 tablets by mouth 2 times daily as needed 10/26/23  Yes ProviderCarmen MD   citalopram (CELEXA) 20 MG tablet Citalopram Oral        active    Provider, MD Carmen       Current medications:    Current Facility-Administered Medications   Medication Dose Route Frequency Provider Last Rate Last Admin   • lactated ringers IV soln infusion   IntraVENous Continuous Oneida Madrid APRN - CRNA 75 mL/hr at 24 New Bag at 24   • sodium chloride flush 0.9 % injection 5-40 mL  5-40 mL IntraVENous 2 times per day Oneida Madrid APRN - CRNA       • sodium chloride flush 0.9 % injection 5-40 mL  5-40 mL IntraVENous PRN Oneida Madrid APRN - CRNA       • 0.9 % sodium chloride infusion   IntraVENous PRN Oneida Madrid APRN - CRNA       • famotidine (PEPCID) 20 mg in sodium chloride (PF) 0.9 % 10 mL injection  20 mg IntraVENous Once Oneida Madrid APRN - CRNA           Allergies:  No Known Allergies    Problem List:  There is no problem list on file for this patient.      Past Medical History:        Diagnosis Date   • Anemia    • Atrial flutter (HCC)        Past Surgical History:  History reviewed. No pertinent surgical history.    Social History:    Social History     Tobacco Use   • Smoking status: Every Day     Current packs/day: 0.25     Types: Cigarettes   • Smokeless tobacco: Never

## 2024-01-08 NOTE — H&P
[email or web address]  [phone]      History and Physical    Patient: Yuko Ugarte MRN: [id number]  SSN: [id number]    YOB: 1980  Age: 43 y.o.  Sex: female      Subjective:      Yuko Ugarte is a 43-year-old female who is being seen today for an initial visit.   1. Epigastric pain/GERD - She reports a longstanding history of upper abdominal pain after eating. For ~20 years this has occurred any time she ate red meat, about 5 years with other meats. For past few months it has been gradually worsening and occurs with any meat and other food, about 90% of her meals. Generally occurs 20-30 min after a meal and lasts several hours.   She has tried beano without improvement. Associated symptoms include sensation of constipation and cramping, followed by watery diarrhea which is self limited.     She has history of GERD which is well controlled on omeprazole 20mg once daily before bed. She notes breakthru heartburn when eating red sauce.   She was taking 800mg ibuprofen BID for chronic back pain for several years. She discontinued this recently at recommendation of hematologist. She was also on voltaren about a year ago, but not currently. She is not currently using medication for back pain.     2. OLESYA - followed by hematology and GYN, suspected to be secondary to menorrhagia. Typically cycle is 28d, but most recent cycle lasted 60d. Menses typically lasts 7 days, sometimes longer. Generally has at least 6 days heavy bleeding, using ultra tampon and changing every 30-60 min even overnight. She has received iron infusions x2, on 10/13 and 10/20.   She denies melena.   She endorses scant BRBPR on toilet paper associated w/ constipation.  .     Past Medical History:   Diagnosis Date    Anemia     Atrial flutter (HCC)      History reviewed. No pertinent surgical history.   History reviewed. No pertinent family history.  Social History     Tobacco Use    Smoking status: Every Day     Current packs/day: 0.25

## 2024-01-08 NOTE — DISCHARGE INSTRUCTIONS
Upper GI Endoscopy: What to Expect at Home  Your Recovery  You had an upper GI endoscopy. Your doctor used a thin, lighted tube that bends to look at the inside of your esophagus, your stomach, and the first part of the small intestine, called the duodenum.  After you have an endoscopy, you will stay at the hospital or clinic for 1 to 2 hours. This will allow the medicine to wear off. You will be able to go home after your doctor or nurse checks to make sure that you're not having any problems.  You may have to stay overnight if you had treatment during the test. You may have a sore throat for a day or two after the test.  This care sheet gives you a general idea about what to expect after the test.  How can you care for yourself at home?  Activity   Rest as much as you need to after you go home.  You should be able to go back to your usual activities the day after the test.  Diet   Follow your doctor's directions for eating after the test.  Drink plenty of fluids (unless your doctor has told you not to).  Medications   If you have a sore throat the day after the test, use an over-the-counter spray to numb your throat.  Follow-up care is a key part of your treatment and safety. Be sure to make and go to all appointments, and call your doctor if you are having problems. It's also a good idea to know your test results and keep a list of the medicines you take.  When should you call for help?   Call 911 anytime you think you may need emergency care. For example, call if:    You passed out (lost consciousness).     You have trouble breathing.     You pass maroon or bloody stools.   Call your doctor now or seek immediate medical care if:    You have pain that does not get better after your take pain medicine.     You have new or worse belly pain.     You have blood in your stools.     You are sick to your stomach and cannot keep fluids down.     You have a fever.     You cannot pass stools or gas.   Watch closely for

## (undated) DEVICE — LINER SUCT CANSTR 3000CC PLAS SFT PRE ASSEMB W/OUT TBNG W/

## (undated) DEVICE — ENDOSCOPY PUMP TUBING/ CAP SET: Brand: ERBE

## (undated) DEVICE — BASIN EMSIS 16OZ GRAPHITE PLAS KID SHP MOLD GRAD FOR ORAL

## (undated) DEVICE — STERILE POLYISOPRENE POWDER-FREE SURGICAL GLOVES: Brand: PROTEXIS

## (undated) DEVICE — YANKAUER,SMOOTH HANDLE,HIGH CAPACITY: Brand: MEDLINE INDUSTRIES, INC.

## (undated) DEVICE — CATHETER SUCT TR FL TIP 14FR W/ O CTRL

## (undated) DEVICE — UNDERPAD INCONT W23XL36IN STD BLU POLYPR BK FLUF SFT

## (undated) DEVICE — BITE BLOCK ENDOSCP UNIV AD 6 TO 9.4 MM

## (undated) DEVICE — SOLUTION IRRIG 1000ML H2O STRL BLT

## (undated) DEVICE — FORCEPS BX L240CM JAW DIA2.4MM ORNG L CAP W/ NDL DISP RAD

## (undated) DEVICE — SYRINGE MED 50ML LUERSLIP TIP

## (undated) DEVICE — CANNULA NSL AD TBNG L14FT STD PVC O2 CRV CONN NONFLARED NSL

## (undated) DEVICE — MEDI-VAC NON-CONDUCTIVE SUCTION TUBING: Brand: CARDINAL HEALTH

## (undated) DEVICE — GAUZE,SPONGE,4"X4",16PLY,STRL,LF,10/TRAY: Brand: MEDLINE

## (undated) DEVICE — SYRINGE MED 25GA 3ML L5/8IN SUBQ PLAS W/ DETACH NDL SFTY